# Patient Record
Sex: MALE | Race: WHITE
[De-identification: names, ages, dates, MRNs, and addresses within clinical notes are randomized per-mention and may not be internally consistent; named-entity substitution may affect disease eponyms.]

---

## 2020-07-02 ENCOUNTER — HOSPITAL ENCOUNTER (OUTPATIENT)
Dept: HOSPITAL 63 - LAB | Age: 70
Discharge: HOME | End: 2020-07-02
Attending: NURSE ANESTHETIST, CERTIFIED REGISTERED
Payer: MEDICARE

## 2020-07-02 DIAGNOSIS — Z20.828: Primary | ICD-10-CM

## 2020-07-03 ENCOUNTER — HOSPITAL ENCOUNTER (OUTPATIENT)
Dept: HOSPITAL 63 - LAB | Age: 70
Discharge: HOME | End: 2020-07-03
Attending: NURSE ANESTHETIST, CERTIFIED REGISTERED
Payer: MEDICARE

## 2020-07-03 DIAGNOSIS — Z11.59: Primary | ICD-10-CM

## 2020-07-07 ENCOUNTER — HOSPITAL ENCOUNTER (OUTPATIENT)
Dept: HOSPITAL 63 - SURG | Age: 70
End: 2020-07-07
Attending: INTERNAL MEDICINE
Payer: MEDICARE

## 2020-07-07 VITALS — DIASTOLIC BLOOD PRESSURE: 95 MMHG | SYSTOLIC BLOOD PRESSURE: 141 MMHG

## 2020-07-07 DIAGNOSIS — Z72.89: ICD-10-CM

## 2020-07-07 DIAGNOSIS — D12.3: ICD-10-CM

## 2020-07-07 DIAGNOSIS — D12.4: ICD-10-CM

## 2020-07-07 DIAGNOSIS — K57.30: ICD-10-CM

## 2020-07-07 DIAGNOSIS — E78.00: ICD-10-CM

## 2020-07-07 DIAGNOSIS — Z80.0: ICD-10-CM

## 2020-07-07 DIAGNOSIS — Z12.11: Primary | ICD-10-CM

## 2020-07-07 DIAGNOSIS — K64.8: ICD-10-CM

## 2020-07-07 PROCEDURE — 45380 COLONOSCOPY AND BIOPSY: CPT

## 2020-07-07 PROCEDURE — 88305 TISSUE EXAM BY PATHOLOGIST: CPT

## 2020-07-08 NOTE — PATHOLOGY
[facility] Accession Number: [id number]

.                                                                01

Material submitted:                                        .

PART A: colon - ASCENDING POLYP. Modifiers: ascending

PART B: colon - SIGMOID POLYP. Modifiers: sigmoid

.                                                                02

**********************************************************************

Diagnosis:

A.  Colon biopsies, ascending colon polyp:

- Tubular adenoma.

.

B.  Colon biopsy, sigmoid polyp:

- Tubular adenoma.

.

(SANCHEZM:valentin; 07/08/2020)

[facility]  07/08/2020  1345 Local

**********************************************************************

.                                                                02

Comment:

There is no high-grade dysplasia or evidence of malignancy.

(SANCHEZM:valentin; 07/08/2020)

.                                                                02

Electronically signed:                                     .

Talha Dickinson MD, Pathologist

NPI- [id number]

.                                                                01

Gross description:                                         .

A. The specimen is received in formalin, labeled "[place], Pierre,

ascending polyp" and consists of 2 fragments of pink-tan tissue measuring

0.3 x 0.2 cm each which are entirely submitted in A1.

.

B. The specimen is received in formalin, labeled "[place], Pierre,

sigmoid polyp" and consists of a fragment of pink-tan tissue measuring 0.4

x 0.2 cm which is entirely submitted in B1.

(SDY; 7/7/2020)

SYU/SYU  07/07/2020  1707 Local

.                                                                02

Pathologist provided ICD-10:

D12.2, D12.5

.                                                                02

CPT                                                        .

719680, [id number]

Specimen Comment: A courtesy copy of this report has been sent to [phone], [phone]

Specimen Comment: [id number]

Specimen Comment: Report sent to  / DR LILLIG

***Performed at:  01

   [address], [place], [place]  [id number]

   MD Dayton Green MD Phone:  [phone]

***Performed at:  02

   [address]  [id number]

   MD Talha Dickinson MD Phone:  [phone]